# Patient Record
(demographics unavailable — no encounter records)

---

## 2025-03-06 NOTE — PHYSICAL EXAM
[de-identified] : Over the volar radial wrist on the right he is tender.  I do not palpate a ganglion.  Not tender along the FCR but tender deep to this or I do not palpate a ganglion.  Nontender STT joint nontender over first dorsal compartment  Plaque-like rash over the volar ulnar aspect of his wrist not infected.  Very slight tenderness over TFCC on the left [de-identified] : PA lateral oblique x-rays of both wrist including clenched fist views are unremarkable.  No osseous erosions or evidence of inflammatory arthritis.  Incidental finding of a well-rounded well-corticated calcification between the third and fourth metacarpal bases.  Looks chronic

## 2025-03-06 NOTE — ASSESSMENT
[FreeTextEntry1] : Clinically the patient has symptoms consistent with a volar radial ganglion cyst however he does have a contralateral skin rash/plaque which could be psoriasis.  Will order MRI to rule out volar ganglion cyst versus inflammatory arthritis.

## 2025-03-06 NOTE — HISTORY OF PRESENT ILLNESS
[Right] : right hand dominant [FreeTextEntry1] : Patient presents with right radial and volar sided wrist pain and left ulnar-sided wrist pain.  Patient states that the right wrist has been hurting for over 2 years along with clicking and the left ulnar wrist has been hurting for a few months.  He believes his symptoms may be from lifting weights.  Patient has not medically treated his symptoms  Patient has a volar ulnar rash which he believes is due to his watchband.  Also reports having a similar reaction around his ring finger from a rubber wedding band.  No history of autoimmune arthritis

## 2025-03-06 NOTE — CONSULT LETTER
[Dear  ___] : Dear  [unfilled], [Consult Letter:] : I had the pleasure of evaluating your patient, [unfilled]. [Please see my note below.] : Please see my note below. [Consult Closing:] : Thank you very much for allowing me to participate in the care of this patient.  If you have any questions, please do not hesitate to contact me. [Sincerely,] : Sincerely, [FreeTextEntry3] : Deric Llanos MD Co-Director [facility]

## 2025-05-07 NOTE — PHYSICAL EXAM
[de-identified] : Over the volar radial wrist on the right he is tender.  I do not palpate a ganglion.  Not tender along the FCR but tender deep to this or I do not palpate a ganglion.  Nontender STT joint nontender over first dorsal compartment  Plaque-like rash over the volar ulnar aspect of his wrist not infected.  Very slight tenderness over TFCC on the left [de-identified] : Ultrasound today suspicious for volar ganglion

## 2025-05-07 NOTE — HISTORY OF PRESENT ILLNESS
[Right] : right hand dominant [FreeTextEntry1] : Patient presents for follow-up evaluation of right radial and volar wrist pain.  Patient diagnosed with a right wrist volar ganglion cyst at last visit.  Patient has been going to therapy rest ice compression and anti-inflammatories for the past 6 weeks. No new injury or trauma.

## 2025-05-07 NOTE — ASSESSMENT
[FreeTextEntry1] : Patient has failed conservative treatment of conservative treatment for 6 weeks including nonsteroidal anti-inflammatories physical therapy and bracing.  Is been close to 3 months in fact.  Recommended ultrasound-guided evaluation and aspiration of volar ganglion cyst of 1 cm